# Patient Record
Sex: FEMALE | Race: WHITE | NOT HISPANIC OR LATINO | Employment: FULL TIME | URBAN - METROPOLITAN AREA
[De-identification: names, ages, dates, MRNs, and addresses within clinical notes are randomized per-mention and may not be internally consistent; named-entity substitution may affect disease eponyms.]

---

## 2019-10-29 ENCOUNTER — APPOINTMENT (EMERGENCY)
Dept: CT IMAGING | Facility: HOSPITAL | Age: 39
End: 2019-10-29
Payer: COMMERCIAL

## 2019-10-29 ENCOUNTER — HOSPITAL ENCOUNTER (EMERGENCY)
Facility: HOSPITAL | Age: 39
Discharge: HOME | End: 2019-10-29
Attending: EMERGENCY MEDICINE | Admitting: EMERGENCY MEDICINE
Payer: COMMERCIAL

## 2019-10-29 VITALS
HEIGHT: 65 IN | SYSTOLIC BLOOD PRESSURE: 137 MMHG | RESPIRATION RATE: 18 BRPM | OXYGEN SATURATION: 97 % | DIASTOLIC BLOOD PRESSURE: 74 MMHG | HEART RATE: 79 BPM | WEIGHT: 280 LBS | BODY MASS INDEX: 46.65 KG/M2 | TEMPERATURE: 98.4 F

## 2019-10-29 DIAGNOSIS — N20.0 KIDNEY STONE ON LEFT SIDE: Primary | ICD-10-CM

## 2019-10-29 LAB
ALBUMIN SERPL BCP-MCNC: 3.5 G/DL (ref 3.5–5)
ALP SERPL-CCNC: 112 U/L (ref 46–116)
ALT SERPL W P-5'-P-CCNC: 31 U/L (ref 12–78)
ANION GAP SERPL CALCULATED.3IONS-SCNC: 3 MMOL/L (ref 4–13)
AST SERPL W P-5'-P-CCNC: 13 U/L (ref 5–45)
BACTERIA UR QL AUTO: ABNORMAL /HPF
BASOPHILS # BLD AUTO: 0.07 THOUSANDS/ΜL (ref 0–0.1)
BASOPHILS NFR BLD AUTO: 1 % (ref 0–1)
BILIRUB SERPL-MCNC: 0.3 MG/DL (ref 0.2–1)
BILIRUB UR QL STRIP: NEGATIVE
BUN SERPL-MCNC: 11 MG/DL (ref 5–25)
CALCIUM SERPL-MCNC: 9.5 MG/DL (ref 8.3–10.1)
CAOX CRY URNS QL MICRO: ABNORMAL /HPF
CHLORIDE SERPL-SCNC: 102 MMOL/L (ref 100–108)
CLARITY UR: CLEAR
CO2 SERPL-SCNC: 31 MMOL/L (ref 21–32)
COLOR UR: YELLOW
CREAT SERPL-MCNC: 0.81 MG/DL (ref 0.6–1.3)
EOSINOPHIL # BLD AUTO: 0.36 THOUSAND/ΜL (ref 0–0.61)
EOSINOPHIL NFR BLD AUTO: 3 % (ref 0–6)
ERYTHROCYTE [DISTWIDTH] IN BLOOD BY AUTOMATED COUNT: 14.7 % (ref 11.6–15.1)
EXT PREG TEST URINE: NEGATIVE
EXT. CONTROL ED NAV: NORMAL
GFR SERPL CREATININE-BSD FRML MDRD: 92 ML/MIN/1.73SQ M
GLUCOSE SERPL-MCNC: 124 MG/DL (ref 65–140)
GLUCOSE UR STRIP-MCNC: NEGATIVE MG/DL
HCT VFR BLD AUTO: 48.6 % (ref 34.8–46.1)
HGB BLD-MCNC: 15.2 G/DL (ref 11.5–15.4)
HGB UR QL STRIP.AUTO: ABNORMAL
IMM GRANULOCYTES # BLD AUTO: 0.05 THOUSAND/UL (ref 0–0.2)
IMM GRANULOCYTES NFR BLD AUTO: 0 % (ref 0–2)
KETONES UR STRIP-MCNC: NEGATIVE MG/DL
LEUKOCYTE ESTERASE UR QL STRIP: NEGATIVE
LYMPHOCYTES # BLD AUTO: 2.67 THOUSANDS/ΜL (ref 0.6–4.47)
LYMPHOCYTES NFR BLD AUTO: 21 % (ref 14–44)
MCH RBC QN AUTO: 26.6 PG (ref 26.8–34.3)
MCHC RBC AUTO-ENTMCNC: 31.3 G/DL (ref 31.4–37.4)
MCV RBC AUTO: 85 FL (ref 82–98)
MONOCYTES # BLD AUTO: 0.59 THOUSAND/ΜL (ref 0.17–1.22)
MONOCYTES NFR BLD AUTO: 5 % (ref 4–12)
MUCOUS THREADS UR QL AUTO: ABNORMAL
NEUTROPHILS # BLD AUTO: 8.95 THOUSANDS/ΜL (ref 1.85–7.62)
NEUTS SEG NFR BLD AUTO: 70 % (ref 43–75)
NITRITE UR QL STRIP: NEGATIVE
NON-SQ EPI CELLS URNS QL MICRO: ABNORMAL /HPF
NRBC BLD AUTO-RTO: 0 /100 WBCS
PH UR STRIP.AUTO: 5.5 [PH]
PLATELET # BLD AUTO: 451 THOUSANDS/UL (ref 149–390)
PMV BLD AUTO: 9.9 FL (ref 8.9–12.7)
POTASSIUM SERPL-SCNC: 3.6 MMOL/L (ref 3.5–5.3)
PROT SERPL-MCNC: 8.1 G/DL (ref 6.4–8.2)
PROT UR STRIP-MCNC: NEGATIVE MG/DL
RBC # BLD AUTO: 5.72 MILLION/UL (ref 3.81–5.12)
RBC #/AREA URNS AUTO: ABNORMAL /HPF
SODIUM SERPL-SCNC: 136 MMOL/L (ref 136–145)
SP GR UR STRIP.AUTO: 1.02 (ref 1–1.03)
UROBILINOGEN UR QL STRIP.AUTO: 0.2 E.U./DL
WBC # BLD AUTO: 12.69 THOUSAND/UL (ref 4.31–10.16)
WBC #/AREA URNS AUTO: ABNORMAL /HPF

## 2019-10-29 PROCEDURE — 74176 CT ABD & PELVIS W/O CONTRAST: CPT

## 2019-10-29 PROCEDURE — 81025 URINE PREGNANCY TEST: CPT | Performed by: PHYSICIAN ASSISTANT

## 2019-10-29 PROCEDURE — 96374 THER/PROPH/DIAG INJ IV PUSH: CPT

## 2019-10-29 PROCEDURE — 99284 EMERGENCY DEPT VISIT MOD MDM: CPT

## 2019-10-29 PROCEDURE — 99284 EMERGENCY DEPT VISIT MOD MDM: CPT | Performed by: PHYSICIAN ASSISTANT

## 2019-10-29 PROCEDURE — 81001 URINALYSIS AUTO W/SCOPE: CPT | Performed by: PHYSICIAN ASSISTANT

## 2019-10-29 PROCEDURE — 85025 COMPLETE CBC W/AUTO DIFF WBC: CPT | Performed by: PHYSICIAN ASSISTANT

## 2019-10-29 PROCEDURE — 80053 COMPREHEN METABOLIC PANEL: CPT | Performed by: PHYSICIAN ASSISTANT

## 2019-10-29 PROCEDURE — 36415 COLL VENOUS BLD VENIPUNCTURE: CPT | Performed by: PHYSICIAN ASSISTANT

## 2019-10-29 RX ORDER — CEFDINIR 300 MG/1
CAPSULE ORAL
COMMUNITY
Start: 2019-10-15 | End: 2022-04-07

## 2019-10-29 RX ORDER — TAMSULOSIN HYDROCHLORIDE 0.4 MG/1
0.4 CAPSULE ORAL
Qty: 5 CAPSULE | Refills: 0 | Status: SHIPPED | OUTPATIENT
Start: 2019-10-29 | End: 2019-11-03

## 2019-10-29 RX ORDER — ONDANSETRON 4 MG/1
TABLET, ORALLY DISINTEGRATING ORAL
COMMUNITY
Start: 2019-10-04 | End: 2022-04-07

## 2019-10-29 RX ORDER — TAMSULOSIN HYDROCHLORIDE 0.4 MG/1
CAPSULE ORAL
COMMUNITY
Start: 2019-10-04 | End: 2022-04-07

## 2019-10-29 RX ORDER — HYDROCHLOROTHIAZIDE 25 MG/1
25 TABLET ORAL DAILY
COMMUNITY
Start: 2015-09-26 | End: 2022-04-07

## 2019-10-29 RX ORDER — TRAZODONE HYDROCHLORIDE 100 MG/1
TABLET ORAL
COMMUNITY
Start: 2019-09-16 | End: 2022-04-07

## 2019-10-29 RX ORDER — TRAMADOL HYDROCHLORIDE 50 MG/1
TABLET ORAL
COMMUNITY
Start: 2019-10-04 | End: 2022-04-07

## 2019-10-29 RX ORDER — OMEPRAZOLE 20 MG/1
TABLET, DELAYED RELEASE ORAL
COMMUNITY
Start: 2016-03-12

## 2019-10-29 RX ORDER — DICLOFENAC SODIUM 75 MG/1
TABLET, DELAYED RELEASE ORAL
COMMUNITY
Start: 2019-09-16 | End: 2022-04-07

## 2019-10-29 RX ORDER — BUPROPION HYDROCHLORIDE 300 MG/1
TABLET ORAL
COMMUNITY
Start: 2016-06-10 | End: 2022-04-07

## 2019-10-29 RX ORDER — KETOROLAC TROMETHAMINE 30 MG/ML
15 INJECTION, SOLUTION INTRAMUSCULAR; INTRAVENOUS ONCE
Status: COMPLETED | OUTPATIENT
Start: 2019-10-29 | End: 2019-10-29

## 2019-10-29 RX ORDER — HYDROCODONE BITARTRATE AND ACETAMINOPHEN 5; 325 MG/1; MG/1
1 TABLET ORAL EVERY 4 HOURS PRN
Qty: 15 TABLET | Refills: 0 | Status: SHIPPED | OUTPATIENT
Start: 2019-10-29 | End: 2022-04-07

## 2019-10-29 RX ORDER — TOPIRAMATE 100 MG/1
100 TABLET, FILM COATED ORAL 2 TIMES DAILY
COMMUNITY
Start: 2019-03-29 | End: 2022-04-07

## 2019-10-29 RX ADMIN — KETOROLAC TROMETHAMINE 15 MG: 30 INJECTION, SOLUTION INTRAMUSCULAR at 19:42

## 2019-10-30 ENCOUNTER — TELEPHONE (OUTPATIENT)
Dept: UROLOGY | Facility: HOSPITAL | Age: 39
End: 2019-10-30

## 2019-10-30 NOTE — DISCHARGE INSTRUCTIONS
Rest, increase fluids  Take vicodin as needed for pain  Follow up with urologist this week for recheck  Return to ER if pain worsens, vomiting, fevers

## 2019-10-30 NOTE — TELEPHONE ENCOUNTER
Per Janki Anand PA-C Pt  Needs appt with first available provider before I can schedule her for any surgery

## 2019-10-30 NOTE — TELEPHONE ENCOUNTER
Called Pt she does not have insurance and is making phone calls and is not located close to [facility] from her home  She needs to get her insurance situated and will call back to get scheduled then

## 2019-12-13 NOTE — TELEPHONE ENCOUNTER
Called pt and her insurance is good right now  Scheduled her in to see Dr LUCERO @ [facility] closer to her home  She was off til xmas

## 2020-01-21 ENCOUNTER — TELEPHONE (OUTPATIENT)
Dept: UROLOGY | Facility: MEDICAL CENTER | Age: 40
End: 2020-01-21

## 2020-01-21 NOTE — TELEPHONE ENCOUNTER
Patient to be seen by Dr Laly Maher at [facility]  Patient called to report S/P Er visit at [address] in [facility]  Had hematuria with clots noted  Had Cat Scan - reported kidney stones are gone    Cat Scan did note a cyst on the Right kidney  Patient has appointment  at this time  Please call if necessary  She can be reached at [phone]  Thank you

## 2020-01-21 NOTE — TELEPHONE ENCOUNTER
Patient discharged from [facility] ER with UTI diagnosis  Sent home with Keflex script  Appointment on 1/24/20 appropriate as scheduled

## 2020-01-24 ENCOUNTER — OFFICE VISIT (OUTPATIENT)
Dept: UROLOGY | Facility: CLINIC | Age: 40
End: 2020-01-24
Payer: COMMERCIAL

## 2020-01-24 VITALS
BODY MASS INDEX: 48.82 KG/M2 | HEART RATE: 72 BPM | DIASTOLIC BLOOD PRESSURE: 80 MMHG | HEIGHT: 65 IN | SYSTOLIC BLOOD PRESSURE: 132 MMHG | WEIGHT: 293 LBS

## 2020-01-24 DIAGNOSIS — N20.0 NEPHROLITHIASIS: ICD-10-CM

## 2020-01-24 DIAGNOSIS — N28.1 RENAL CYST, ACQUIRED, RIGHT: ICD-10-CM

## 2020-01-24 DIAGNOSIS — N30.01 ACUTE CYSTITIS WITH HEMATURIA: Primary | ICD-10-CM

## 2020-01-24 PROCEDURE — 99204 OFFICE O/P NEW MOD 45 MIN: CPT | Performed by: UROLOGY

## 2020-01-24 RX ORDER — CEFUROXIME AXETIL 500 MG/1
500 TABLET ORAL EVERY 12 HOURS SCHEDULED
Qty: 10 TABLET | Refills: 0 | Status: SHIPPED | OUTPATIENT
Start: 2020-01-24 | End: 2020-01-25 | Stop reason: SDUPTHER

## 2020-01-24 NOTE — PROGRESS NOTES
UROLOGY NEW CONSULT NOTE     CHIEF COMPLAINT   Ajit Varela is a 46 y o  male with a complaint of   Chief Complaint   Patient presents with    Nephrolithiasis       History of Present Illness:     46 y o  female who presented to the emergency room in October of 2019  Patient was found to have a 5 mm ureterovesical junction stone  Patient's discomfort had been ongoing for approximately 8 weeks prior to presenting  She did not have insurance at the time and so her follow-up was delayed  She presents today for discussion  Patient was recently seen in an outside emergency room due to burning and hematuria  She was diagnosed with urinary tract infection started on a cephalosporin antibiotic  She has lost the prescription  She does feel better  CT scan was obtained that time that demonstrated passage of the stone    It re- demonstrated a right-sided renal cyst     Past Medical History:     Past Medical History:   Diagnosis Date    Hypertension        PAST SURGICAL HISTORY:     Past Surgical History:   Procedure Laterality Date    KNEE SURGERY      TONSILLECTOMY         CURRENT MEDICATIONS:     Current Outpatient Medications   Medication Sig Dispense Refill    buPROPion (WELLBUTRIN XL) 300 mg 24 hr tablet       cefdinir (OMNICEF) 300 mg capsule       diclofenac (VOLTAREN) 75 mg EC tablet TAKE ONE TABLET BY MOUTH TWICE DAILY      hydrochlorothiazide (HYDRODIURIL) 25 mg tablet Take 25 mg by mouth daily      HYDROcodone-acetaminophen (NORCO) 5-325 mg per tablet Take 1 tablet by mouth every 4 (four) hours as needed for painMax Daily Amount: 6 tablets 15 tablet 0    omeprazole (PRILOSEC OTC) 20 MG tablet   Start: 10/03/19 23:42:00 EDT      ondansetron (ZOFRAN-ODT) 4 mg disintegrating tablet       OXYCODONE HCL PO       tamsulosin (FLOMAX) 0 4 mg       topiramate (TOPAMAX) 100 mg tablet Take 100 mg by mouth 2 (two) times a day      traMADol (ULTRAM) 50 mg tablet       traZODone (DESYREL) 100 mg tablet TAKE ONE TABLET BY MOUTH ONE TIME DAILY IN THE EVENING      cefuroxime (CEFTIN) 500 mg tablet Take 1 tablet (500 mg total) by mouth every 12 (twelve) hours for 5 days 10 tablet 0    tamsulosin (FLOMAX) 0 4 mg Take 1 capsule (0 4 mg total) by mouth daily with dinner for 5 days 5 capsule 0     No current facility-administered medications for this visit  ALLERGIES:   No Known Allergies    SOCIAL HISTORY:     Social History     Socioeconomic History    Marital status: Single     Spouse name: None    Number of children: None    Years of education: None    Highest education level: None   Occupational History    None   Social Needs    Financial resource strain: None    Food insecurity:     Worry: None     Inability: None    Transportation needs:     Medical: None     Non-medical: None   Tobacco Use    Smoking status: Current Every Day Smoker     Packs/day: 0 25    Smokeless tobacco: Never Used   Substance and Sexual Activity    Alcohol use: Not Currently     Comment: socially     Drug use: Never    Sexual activity: None   Lifestyle    Physical activity:     Days per week: None     Minutes per session: None    Stress: None   Relationships    Social connections:     Talks on phone: None     Gets together: None     Attends Scientologist service: None     Active member of club or organization: None     Attends meetings of clubs or organizations: None     Relationship status: None    Intimate partner violence:     Fear of current or ex partner: None     Emotionally abused: None     Physically abused: None     Forced sexual activity: None   Other Topics Concern    None   Social History Narrative    None       SOCIAL HISTORY:   History reviewed  No pertinent family history  REVIEW OF SYSTEMS:     Review of Systems   Constitutional: Negative  Respiratory: Negative  Cardiovascular: Negative  Gastrointestinal: Negative  Genitourinary: Positive for dysuria and hematuria  Musculoskeletal: Negative  Skin: Negative  Psychiatric/Behavioral: Negative  PHYSICAL EXAM:     /80   Pulse 72   Ht 5' 5" (1 651 m)   Wt 133 kg (294 lb)   BMI 48 92 kg/m²     General:    Morbidly obese female in no acute distress  They have a normal affect  There is not appear to be any gross neurologic defects or abnormalities  HEENT:  Normocephalic, atraumatic  Neck is supple without any palpable lymphadenopathy  Cardiovascular:  Patient has normal palpable distal radial pulses  There is no significant peripheral edema  No JVD is noted  Respiratory:  Patient has unlabored respirations  There is no audible wheeze or rhonchi  Abdomen:  Abdomen is soft and nontender  There is no tympany  Inguinal and umbilical hernia are not appreciated  Musculoskeletal:  Patient does not have significant CVA tenderness in the  flank with palpation or percussion  They full range of motion in all 4 extremities  Strength in all 4 extremities appears congruent  Patient is able to ambulate without assistance or difficulty  Dermatologic:  Patient has no skin abnormalities or rashes  LABS:     CBC:   Lab Results   Component Value Date    WBC 12 69 (H) 10/29/2019    HGB 15 2 10/29/2019    HCT 48 6 (H) 10/29/2019    MCV 85 10/29/2019     (H) 10/29/2019       BMP:   Lab Results   Component Value Date    CALCIUM 9 5 10/29/2019    K 3 6 10/29/2019    CO2 31 10/29/2019     10/29/2019    BUN 11 10/29/2019    CREATININE 0 81 10/29/2019       IMAGING:     10/29/19  CT ABDOMEN AND PELVIS WITHOUT IV CONTRAST - LOW DOSE RENAL STONE      INDICATION:   left flank pain      COMPARISON:  None      TECHNIQUE:  Low dose thin section CT examination of the abdomen and pelvis was performed without intravenous or oral contrast according to a protocol specifically designed to evaluate for urinary tract calculus    Axial, sagittal, and coronal 2D   reformatted images were created from the source data and submitted for interpretation  Evaluation for pathology in the abdomen and pelvis that is unrelated to urinary tract calculi is limited       Radiation dose length product (DLP) for this visit:  951 mGy-cm   This examination, like all CT scans performed in the [facility], was performed utilizing techniques to minimize radiation dose exposure, including the use of iterative   reconstruction and automated exposure control       FINDINGS:     RIGHT KIDNEY AND URETER:  No urinary tract calculi  No hydronephrosis or hydroureter  4 5 cm interpole cortical cyst     LEFT KIDNEY AND URETER:  5 mm left UV junction calculus resulting in mild left hydroureteronephrosis      URINARY BLADDER:   Unremarkable      No significant abnormality in the visualized lung bases      Limited low radiation dose noncontrast CT evaluation demonstrates no clinically significant abnormality of liver, spleen, pancreas, or adrenal glands  No calcified gallstones or gallbladder wall thickening noted  No ascites or bulky lymphadenopathy on this limited noncontrast study  Bowel loops appear unremarkable  The appendix is well seen and there is no evidence of acute appendicitis  IUD noted in the endometrium  No acute fracture or destructive osseous lesion is identified         IMPRESSION:     5 mm left UV junction calculus resulting in mild left hydroureteronephrosis  ASSESSMENT:     46 y o  female with 5mm distal LEFT UVJ stone    PLAN:      fortunately, the patient's stone has appeared to pass based on her recent outside CT scan which is available for review in Care everywhere  Patient has a renal cyst which we discussed his likely simple and benign based on my review of the films  Patient will obtain x-ray and ultrasound in 6 months which will both trend any additional stone formation but also tracked the renal cyst   We discussed dietary changes that can improve stone formation    Patient will be undergoing gastric bypass surgery in the near future and we will likely consider adding calcium citrate at her next visit  Patient knows to call me with any recurrent urinary symptoms or symptoms of stone disease in the interim

## 2020-01-25 DIAGNOSIS — N30.01 ACUTE CYSTITIS WITH HEMATURIA: ICD-10-CM

## 2020-01-25 RX ORDER — CEFUROXIME AXETIL 500 MG/1
TABLET ORAL
Qty: 10 TABLET | Refills: 0 | Status: SHIPPED | OUTPATIENT
Start: 2020-01-25 | End: 2020-01-30

## 2020-06-26 ENCOUNTER — TELEPHONE (OUTPATIENT)
Dept: UROLOGY | Facility: CLINIC | Age: 40
End: 2020-06-26

## 2022-04-07 ENCOUNTER — OFFICE VISIT (OUTPATIENT)
Dept: URGENT CARE | Facility: CLINIC | Age: 42
End: 2022-04-07
Payer: MEDICARE

## 2022-04-07 VITALS
DIASTOLIC BLOOD PRESSURE: 90 MMHG | OXYGEN SATURATION: 99 % | RESPIRATION RATE: 18 BRPM | BODY MASS INDEX: 31.01 KG/M2 | TEMPERATURE: 97.9 F | HEART RATE: 75 BPM | SYSTOLIC BLOOD PRESSURE: 132 MMHG | HEIGHT: 63 IN | WEIGHT: 175 LBS

## 2022-04-07 DIAGNOSIS — K08.89 PAIN, DENTAL: Primary | ICD-10-CM

## 2022-04-07 PROCEDURE — 96372 THER/PROPH/DIAG INJ SC/IM: CPT | Performed by: PHYSICIAN ASSISTANT

## 2022-04-07 PROCEDURE — 99213 OFFICE O/P EST LOW 20 MIN: CPT | Performed by: PHYSICIAN ASSISTANT

## 2022-04-07 RX ORDER — KETOROLAC TROMETHAMINE 30 MG/ML
60 INJECTION, SOLUTION INTRAMUSCULAR; INTRAVENOUS ONCE
Status: COMPLETED | OUTPATIENT
Start: 2022-04-07 | End: 2022-04-07

## 2022-04-07 RX ORDER — SERTRALINE HYDROCHLORIDE 100 MG/1
TABLET, FILM COATED ORAL
COMMUNITY
Start: 2022-01-26

## 2022-04-07 RX ORDER — QUETIAPINE FUMARATE 100 MG/1
150 TABLET, FILM COATED ORAL
COMMUNITY
Start: 2022-02-04 | End: 2023-02-04

## 2022-04-07 RX ORDER — AMOXICILLIN 500 MG/1
500 CAPSULE ORAL EVERY 12 HOURS SCHEDULED
Qty: 20 CAPSULE | Refills: 0 | Status: SHIPPED | OUTPATIENT
Start: 2022-04-07 | End: 2022-04-17

## 2022-04-07 RX ADMIN — KETOROLAC TROMETHAMINE 60 MG: 30 INJECTION, SOLUTION INTRAMUSCULAR; INTRAVENOUS at 13:13

## 2022-04-07 NOTE — PATIENT INSTRUCTIONS
Dental Abscess   WHAT YOU NEED TO KNOW:   A dental abscess is a collection of pus in or around a tooth  A dental abscess is caused by bacteria  The bacteria can enter the tooth when the enamel (outer part of the tooth) is damaged by tooth decay  Bacteria can also enter the tooth through a chip in the tooth or a cut in the gum  Food particles that are stuck between the teeth for a long time may also lead to an abscess  DISCHARGE INSTRUCTIONS:   Return to the emergency department if:   · You have severe pain in your tooth or jaw  · You have trouble breathing because of pain or swelling  Call your doctor if:   · Your symptoms get worse, even after treatment  · Your mouth is bleeding  · You cannot eat or drink because of pain or swelling  · Your abscess returns  · You have an injury that causes a crack in your tooth  · You have questions or concerns about your condition or care  Medicines: You may  need any of the following:  · Antibiotics  help treat a bacterial infection  · NSAIDs , such as ibuprofen, help decrease swelling, pain, and fever  This medicine is available with or without a doctor's order  NSAIDs can cause stomach bleeding or kidney problems in certain people  If you take blood thinner medicine, always ask your healthcare provider if NSAIDs are safe for you  Always read the medicine label and follow directions  · Acetaminophen  decreases pain and fever  It is available without a doctor's order  Ask how much to take and how often to take it  Follow directions  Read the labels of all other medicines you are using to see if they also contain acetaminophen, or ask your doctor or pharmacist  Acetaminophen can cause liver damage if not taken correctly  Do not use more than 4 grams (4,000 milligrams) total of acetaminophen in one day  · Prescription pain medicine  may be given  Ask your healthcare provider how to take this medicine safely   Some prescription pain medicines contain acetaminophen  Do not take other medicines that contain acetaminophen without talking to your healthcare provider  Too much acetaminophen may cause liver damage  Prescription pain medicine may cause constipation  Ask your healthcare provider how to prevent or treat constipation  · Take your medicine as directed  Contact your healthcare provider if you think your medicine is not helping or if you have side effects  Tell him of her if you are allergic to any medicine  Keep a list of the medicines, vitamins, and herbs you take  Include the amounts, and when and why you take them  Bring the list or the pill bottles to follow-up visits  Carry your medicine list with you in case of an emergency  Self-care:   · Rinse your mouth every 2 hours with salt water  This will help keep the area clean  · Gently brush your teeth twice a day with a soft tooth brush  This will help keep the area clean  · Eat soft foods as directed  Soft foods may cause less pain  Examples include applesauce, yogurt, and cooked pasta  Ask your healthcare provider how long to follow this instruction  · Apply a warm compress to your tooth or gum  Use a cotton ball or gauze soaked in warm water  Remove the compress in 10 minutes or when it becomes cool  Repeat 3 times a day  Prevent another abscess:   · Brush your teeth at least 2 times a day  with fluoride toothpaste  · Use dental floss at least once a day  to clean between your teeth  · Rinse your mouth with water or mouthwash  after meals and snacks  Chew sugarless gum  · Avoid sugary and starchy food that can stick between your teeth  Limit drinks high in sugar, such as soda or fruit juice  · See your dentist every 6 months  for dental cleanings and oral exams  Follow up with your doctor or dentist in 24 hours, or as directed: Your healthcare provider will need to check your teeth and gums   Write down your questions so you remember to ask them during your visits  © Copyright [employer] 2022 Information is for End User's use only and may not be sold, redistributed or otherwise used for commercial purposes  All illustrations and images included in CareNotes® are the copyrighted property of A D A M [employer] Inc  or Yifan Ewing  The above information is an  only  It is not intended as medical advice for individual conditions or treatments  Talk to your doctor, nurse or pharmacist before following any medical regimen to see if it is safe and effective for you

## 2022-04-07 NOTE — PROGRESS NOTES
[facility]        NAME: Kristie Ramey is a 40 y o  female  : 1980    MRN: [id number]  DATE: 2022  TIME: 2:02 PM    Assessment and Plan   Pain, dental [K08 89]  1  Pain, dental  amoxicillin (AMOXIL) 500 mg capsule    ketorolac (TORADOL) injection 60 mg     Infection verse cavity/nerve pain  Will trial antibiotics and have her follow-up with Dentistry  Patient Instructions   Take antibiotic as prescribed  Complete full dose even symptoms began to improve  Tylenol pure Orajel  Over-the-counter temporary filling  Make appoint with dentist     Follow up with PCP in 3-5 days  Proceed to  ER if symptoms worsen  Chief Complaint     Chief Complaint   Patient presents with    Dental Pain     Right side of face dental pain Started 3 days agol          History of Present Illness       Patient is a 28-year-old dental pain is located to right upper teeth described as 5/10 which is worse with eating and drinking  States he has had problems with this tooth before and was told she may need a root canal in the future  Denies fevers, chills, tongue and throat swelling, or facial swelling  She took Tylenol with no relief  States she has called multiple oral surgeons to our and able to see her for 1 or 2 months  Review of Systems   Review of Systems   Constitutional: Negative for chills and fever  HENT: Positive for dental problem  Negative for congestion, facial swelling, postnasal drip, rhinorrhea, sore throat and trouble swallowing  Respiratory: Negative for cough            Current Medications       Current Outpatient Medications:     QUEtiapine (SEROquel) 100 mg tablet, Take 100 mg by mouth, Disp: , Rfl:     amoxicillin (AMOXIL) 500 mg capsule, Take 1 capsule (500 mg total) by mouth every 12 (twelve) hours for 10 days, Disp: 20 capsule, Rfl: 0    omeprazole (PRILOSEC OTC) 20 MG tablet,  Start: 10/03/19 23:42:00 EDT, Disp: , Rfl:     sertraline (ZOLOFT) 100 mg tablet, , Disp: , Rfl:   No current facility-administered medications for this visit  Current Allergies     Allergies as of 04/07/2022    (No Known Allergies)            The following portions of the patient's history were reviewed and updated as appropriate: allergies, current medications, past family history, past medical history, past social history, past surgical history and problem list      Past Medical History:   Diagnosis Date    Hypertension        Past Surgical History:   Procedure Laterality Date    GASTRIC BYPASS      KNEE SURGERY      TONSILLECTOMY         History reviewed  No pertinent family history  Medications have been verified  Objective   /90   Pulse 75   Temp 97 9 °F (36 6 °C)   Resp 18   Ht 5' 3" (1 6 m)   Wt 79 4 kg (175 lb)   SpO2 99%   BMI 31 00 kg/m²   No LMP recorded  Patient has had an implant  Physical Exam     Physical Exam  Vitals and nursing note reviewed  Constitutional:       Appearance: Normal appearance  She is well-developed  HENT:      Head: Normocephalic and atraumatic  Right Ear: Tympanic membrane, ear canal and external ear normal       Left Ear: Tympanic membrane, ear canal and external ear normal       Nose: Nose normal       Mouth/Throat:      Mouth: Mucous membranes are moist       Dentition: Dental tenderness present  No gingival swelling, dental caries or dental abscesses  Pharynx: Oropharynx is clear  Uvula midline  Eyes:      General: Lids are normal       Conjunctiva/sclera: Conjunctivae normal       Pupils: Pupils are equal, round, and reactive to light  Cardiovascular:      Rate and Rhythm: Normal rate and regular rhythm  Pulses: Normal pulses  Heart sounds: Normal heart sounds  No murmur heard  No friction rub  No gallop  Pulmonary:      Effort: Pulmonary effort is normal       Breath sounds: Normal breath sounds  No wheezing, rhonchi or rales     Musculoskeletal:         General: Normal range of motion  Cervical back: Neck supple  Lymphadenopathy:      Cervical: No cervical adenopathy  Skin:     General: Skin is warm and dry  Capillary Refill: Capillary refill takes less than 2 seconds  Neurological:      Mental Status: She is alert

## 2022-04-27 ENCOUNTER — OFFICE VISIT (OUTPATIENT)
Dept: URGENT CARE | Facility: CLINIC | Age: 42
End: 2022-04-27
Payer: MEDICARE

## 2022-04-27 VITALS
BODY MASS INDEX: 31.36 KG/M2 | WEIGHT: 177 LBS | TEMPERATURE: 97.2 F | SYSTOLIC BLOOD PRESSURE: 137 MMHG | OXYGEN SATURATION: 98 % | HEIGHT: 63 IN | DIASTOLIC BLOOD PRESSURE: 81 MMHG | RESPIRATION RATE: 16 BRPM | HEART RATE: 81 BPM
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
## 2022-04-27 PROCEDURE — 99213 OFFICE O/P EST LOW 20 MIN: CPT | Performed by: PHYSICIAN ASSISTANT

## 2022-04-27 RX ORDER — DOXYCYCLINE 100 MG/1
200 TABLET ORAL ONCE
Qty: 2 TABLET | Refills: 0 | Status: SHIPPED | OUTPATIENT
Start: 2022-04-27 | End: 2022-04-27

## 2022-04-27 RX ORDER — LOSARTAN POTASSIUM 50 MG/1
50 TABLET ORAL DAILY
COMMUNITY
Start: 2022-04-19

## 2022-04-27 NOTE — PATIENT INSTRUCTIONS
Tick Bite   WHAT YOU NEED TO KNOW:   Ticks need to be removed quickly  Most tick bites are not dangerous, but ticks can pass disease or infection when they bite  Diseases include Lyme disease, babesiosis, tularemia, and [place] Spotted Fever  Signs and symptoms may develop weeks or even months after a bite  Some may happen right away, such as redness, pain, itching, and swelling near the bite area  Watch for a fever, rash, body aches, or breathing problems  These may be symptoms of a serious disease that needs to be treated quickly  DISCHARGE INSTRUCTIONS:   Return to the emergency department if:   · You have trouble walking or moving your legs  · You have joint pain, muscle pain, or muscle weakness within 1 month of a tick bite  · You have a fever, chills, headache, or rash  Call your doctor if:   · You cannot remove the tick  · The tick's head is stuck in your skin  · You have questions or concerns about your condition or care  Medicines:   · Medicines  help decrease pain, redness, itching, and swelling  You may also need medicine to prevent or fight a bacterial infection  These medicines may be given as a cream, lotion, or pill  · Take your medicine as directed  Contact your healthcare provider if you think your medicine is not helping or if you have side effects  Tell him of her if you are allergic to any medicine  Keep a list of the medicines, vitamins, and herbs you take  Include the amounts, and when and why you take them  Bring the list or the pill bottles to follow-up visits  Carry your medicine list with you in case of an emergency  How to remove a tick:  Remove the tick as soon as possible to help prevent disease or infection  You are less likely to get sick from a tick bite if you remove the tick within 24 hours  Do not use petroleum jelly, nail polish, rubbing alcohol, or heat  These do not work and may be dangerous   Do the following to remove a tick:  · First, try a soapy cotton ball  Soak a cotton ball in liquid soap  Cover the tick with the cotton ball for 30 seconds  The tick may come off with the cotton ball when you pull it away  · Use tweezers if the soapy cotton ball does not work  Grasp the tick as close to your skin as possible  Pull the tick straight up and out  Do not touch the tick with your bare hands  · Do not twist or jerk the tick suddenly, because this may break off the tick's head or mouth parts  Do not leave any part of the tick in your skin  · Do not crush or squeeze the tick since its body may be infected with germs  Flush the tick down the toilet  · After the tick is removed, clean the area of the bite with rubbing alcohol  Then wash your hands with soap and water  Apply ice  on your bite for 15 to 20 minutes every hour or as directed  Use an ice pack, or put crushed ice in a plastic bag  Cover it with a towel before you apply it to your skin  Ice helps prevent tissue damage and decreases swelling and pain  Prevent a tick bite:  Ticks live in areas covered by brush and grass  They may even be found in your lawn if you live in certain areas  Outdoor pets can carry ticks inside the house  Ticks can grab onto you or your clothes when you walk by grass or brush  If you go into areas that contain many trees, tall grasses, and underbrush, do the following:  · Wear light colored pants and a long-sleeved shirt  Tuck your pants into your socks or boots  Tuck in your shirt  Wear sleeves that fit close to the skin at your wrists and neck  This will help prevent ticks from crawling through gaps in your clothing and onto your skin  Wear a hat in areas with trees  · Apply insect repellant on your skin  The insect repellant should contain DEET  Do not put insect repellant on skin that is cut, scratched, or irritated  Always use soap and water to wash the insect repellant off as soon as possible once you are indoors   Do not apply insect repellant on your child's face or hands  · Spray insect repellant onto your clothes  Use permethrin spray  This spray kills ticks that crawl on your clothing  Be sure to spray the tops of your boots, bottom of pant legs, and sleeve cuffs  As soon as possible, wash and dry clothing in hot water and high heat  · Check your clothing, hair, and skin for ticks  Shower within 2 hours of coming indoors  Carefully check the hairline, armpits, neck, and waist  Check your pets and children for ticks  Remove ticks from pets the same way as you remove them from people  · Decrease the risk for ticks in your yard  Ticks like to live in shady, moist areas  [place] your lawn regularly to keep the grass short  Trim the grass around birdbaths and fences  Cut branches that are overgrown and take them out of the yard  Clear out leaf piles  [place] firewood in a dry, jaspal area  Follow up with your doctor as directed:  Write down your questions so you remember to ask them during your visits  © Copyright [employer] 2022 Information is for End User's use only and may not be sold, redistributed or otherwise used for commercial purposes  All illustrations and images included in CareNotes® are the copyrighted property of [employer]  or [employer]if[employer] Ewing  The above information is an  only  It is not intended as medical advice for individual conditions or treatments  Talk to your doctor, nurse or pharmacist before following any medical regimen to see if it is safe and effective for you

## 2022-04-27 NOTE — PROGRESS NOTES
[facility]        NAME: Kristie Ramey is a 40 y o  female  : 1980    MRN: [id number]  DATE: 2022  TIME: 1:02 PM    /81   Pulse 81   Temp (!) 97 2 °F (36 2 °C)   Resp 16   Ht 5' 3" (1 6 m)   Wt 80 3 kg (177 lb)   SpO2 98%   BMI 31 35 kg/m²     Assessment and Plan   Tick bite, unspecified site, initial encounter [X10  XXXA]  1  Tick bite, unspecified site, initial encounter  doxycycline (ADOXA) 100 MG tablet         Patient Instructions       Follow up with PCP in 3-5 days  Proceed to  ER if symptoms worsen  Chief Complaint     Chief Complaint   Patient presents with    tic bite     reports removed tic from right low leg yesterday, leg now has round [place] with dark center  area burns         History of Present Illness       Pt with tic bite to right ankle yesterday        Review of Systems   Review of Systems   Constitutional: Negative  HENT: Negative  Eyes: Negative  Respiratory: Negative  Cardiovascular: Negative  Gastrointestinal: Negative  Endocrine: Negative  Genitourinary: Negative  Negative for flank pain  Musculoskeletal: Negative  Skin: Positive for rash  Allergic/Immunologic: Negative  Neurological: Negative  Hematological: Negative  Psychiatric/Behavioral: Negative  All other systems reviewed and are negative          Current Medications       Current Outpatient Medications:     losartan (COZAAR) 50 mg tablet, Take 50 mg by mouth daily, Disp: , Rfl:     QUEtiapine (SEROquel) 100 mg tablet, Take 150 mg by mouth  , Disp: , Rfl:     sertraline (ZOLOFT) 100 mg tablet, , Disp: , Rfl:     doxycycline (ADOXA) 100 MG tablet, Take 2 tablets (200 mg total) by mouth 1 (one) time for 1 dose, Disp: 2 tablet, Rfl: 0    omeprazole (PRILOSEC OTC) 20 MG tablet,  Start: 10/03/19 23:42:00 EDT (Patient not taking: Reported on 2022), Disp: , Rfl:     Current Allergies     Allergies as of 2022 - Reviewed 2022   Allergen Reaction Noted    Nsaids Other (See Comments) 04/27/2022            The following portions of the patient's history were reviewed and updated as appropriate: allergies, current medications, past family history, past medical history, past social history, past surgical history and problem list      Past Medical History:   Diagnosis Date    Anxiety     Depression     Hypertension        Past Surgical History:   Procedure Laterality Date    GASTRIC BYPASS      KNEE SURGERY      TONSILLECTOMY         Family History   Problem Relation Age of Onset    No Known Problems Mother     No Known Problems Father          Medications have been verified  Objective   /81   Pulse 81   Temp (!) 97 2 °F (36 2 °C)   Resp 16   Ht 5' 3" (1 6 m)   Wt 80 3 kg (177 lb)   SpO2 98%   BMI 31 35 kg/m²        Physical Exam     Physical Exam  Vitals and nursing note reviewed  Constitutional:       Appearance: Normal appearance  She is normal weight  HENT:      Head: Normocephalic and atraumatic  Right Ear: Tympanic membrane, ear canal and external ear normal       Left Ear: Tympanic membrane, ear canal and external ear normal       Nose: Nose normal       Mouth/Throat:      Mouth: Mucous membranes are moist       Pharynx: Oropharynx is clear  Eyes:      Extraocular Movements: Extraocular movements intact  Conjunctiva/sclera: Conjunctivae normal       Pupils: Pupils are equal, round, and reactive to light  Cardiovascular:      Rate and Rhythm: Normal rate and regular rhythm  Pulses: Normal pulses  Heart sounds: Normal heart sounds  Pulmonary:      Effort: Pulmonary effort is normal       Breath sounds: Normal breath sounds  Abdominal:      General: Abdomen is flat  Bowel sounds are normal       Palpations: Abdomen is soft  Musculoskeletal:         General: Normal range of motion  Cervical back: Normal range of motion  Left lower leg: No edema  Skin:     General: Skin is warm  Capillary Refill: Capillary refill takes less than 2 seconds  Comments: Right anterior ankle tick bite 4mm erythema no swelling no pain    Neurological:      General: No focal deficit present  Mental Status: She is alert and oriented to person, place, and time     Psychiatric:         Mood and Affect: Mood normal          Behavior: Behavior normal

## 2023-06-23 ENCOUNTER — OFFICE VISIT (OUTPATIENT)
Age: 43
End: 2023-06-23
Payer: MEDICARE

## 2023-06-23 VITALS
SYSTOLIC BLOOD PRESSURE: 160 MMHG | TEMPERATURE: 97.4 F | DIASTOLIC BLOOD PRESSURE: 90 MMHG | RESPIRATION RATE: 18 BRPM | BODY MASS INDEX: 33.32 KG/M2 | OXYGEN SATURATION: 99 % | WEIGHT: 188.05 LBS | HEART RATE: 73 BPM | HEIGHT: 63 IN

## 2023-06-23 DIAGNOSIS — K08.89 PAIN, DENTAL: Primary | ICD-10-CM

## 2023-06-23 PROCEDURE — 99213 OFFICE O/P EST LOW 20 MIN: CPT | Performed by: EMERGENCY MEDICINE

## 2023-06-23 RX ORDER — CLINDAMYCIN HYDROCHLORIDE 150 MG/1
150 CAPSULE ORAL EVERY 6 HOURS SCHEDULED
Qty: 28 CAPSULE | Refills: 0 | Status: SHIPPED | OUTPATIENT
Start: 2023-06-23 | End: 2023-06-30

## 2023-06-23 RX ORDER — GABAPENTIN 100 MG/1
100 CAPSULE ORAL 3 TIMES DAILY
COMMUNITY

## 2023-06-23 NOTE — PATIENT INSTRUCTIONS
Toothache   WHAT YOU NEED TO KNOW:   A toothache is pain that is caused by irritation of the nerves in the center of your tooth  The irritation may be caused by several problems, such as a cavity, an infection, a cracked tooth, or gum disease  DISCHARGE INSTRUCTIONS:   Return to the emergency department if:   You have trouble breathing or swallowing  You have swelling in your face or neck  Contact your dentist if:   You have a fever and chills  You have trouble opening or closing your mouth  You have swelling around your tooth  You have questions or concerns about your condition or care  Medicines: You may  need any of the following:  NSAIDs , such as ibuprofen, help decrease swelling, pain, and fever  This medicine is available with or without a doctor's order  NSAIDs can cause stomach bleeding or kidney problems in certain people  If you take blood thinner medicine, always ask if NSAIDs are safe for you  Always read the medicine label and follow directions  Do not give these medicines to children younger than 6 months without direction from a healthcare provider  Acetaminophen  decreases pain and fever  It is available without a doctor's order  Ask how much to take and how often to take it  Follow directions  Acetaminophen can cause liver damage if not taken correctly  Prescription pain medicine  may be given  Ask your healthcare provider how to take this medicine safely  Some prescription pain medicines contain acetaminophen  Do not take other medicines that contain acetaminophen without talking to your healthcare provider  Too much acetaminophen may cause liver damage  Prescription pain medicine may cause constipation  Ask your healthcare provider how to prevent or treat constipation  Antibiotics  help treat or prevent a bacterial infection  Take your medicine as directed    Contact your healthcare provider if you think your medicine is not helping or if you have side effects  Tell your provider if you are allergic to any medicine  Keep a list of the medicines, vitamins, and herbs you take  Include the amounts, and when and why you take them  Bring the list or the pill bottles to follow-up visits  Carry your medicine list with you in case of an emergency  Self-care:   Rinse your mouth with warm salt water  4 times a day or as directed  Eat soft foods  to help relieve pain caused by chewing  Apply ice on your jaw or cheek  for 15 to 20 minutes every hour or as directed  Use an ice pack, or put crushed ice in a plastic bag  Cover it with a towel before you apply it  Ice helps prevent tissue damage and decreases swelling and pain  Help prevent a toothache:   Brush your teeth at least 2 times a day  Use dental floss to clean between your teeth at least 1 time a day  See your dentist regularly every 6 months for dental cleanings and oral exams  Follow up with your dentist as directed: You may be referred to a dental surgeon  Write down your questions so you remember to ask them during your visits  © Copyright Rollene Matar 2022 Information is for End User's use only and may not be sold, redistributed or otherwise used for commercial purposes  The above information is an  only  It is not intended as medical advice for individual conditions or treatments  Talk to your doctor, nurse or pharmacist before following any medical regimen to see if it is safe and effective for you

## 2023-06-23 NOTE — PROGRESS NOTES
[facility]        NAME: Mar Millan is a 44 y o  female  : 1980    MRN: [id number]  DATE: 2023  TIME: 6:07 PM    Assessment and Plan   Pain, dental [K08 89]  1  Pain, dental  clindamycin (CLEOCIN) 150 mg capsule    CANCELED: XR tibia fibula 2 vw right    CANCELED: XR spine lumbar 2 or 3 views injury        After informed verbal consent dental block 1% lidocaine with epi, no complications  Patient Instructions     Patient Instructions     Toothache   WHAT YOU NEED TO KNOW:   A toothache is pain that is caused by irritation of the nerves in the center of your tooth  The irritation may be caused by several problems, such as a cavity, an infection, a cracked tooth, or gum disease  DISCHARGE INSTRUCTIONS:   Return to the emergency department if:   • You have trouble breathing or swallowing  • You have swelling in your face or neck  Contact your dentist if:   • You have a fever and chills  • You have trouble opening or closing your mouth  • You have swelling around your tooth  • You have questions or concerns about your condition or care  Medicines: You may  need any of the following:  • NSAIDs , such as ibuprofen, help decrease swelling, pain, and fever  This medicine is available with or without a doctor's order  NSAIDs can cause stomach bleeding or kidney problems in certain people  If you take blood thinner medicine, always ask if NSAIDs are safe for you  Always read the medicine label and follow directions  Do not give these medicines to children younger than 6 months without direction from a healthcare provider  • Acetaminophen  decreases pain and fever  It is available without a doctor's order  Ask how much to take and how often to take it  Follow directions  Acetaminophen can cause liver damage if not taken correctly  • Prescription pain medicine  may be given  Ask your healthcare provider how to take this medicine safely   Some prescription pain medicines contain acetaminophen  Do not take other medicines that contain acetaminophen without talking to your healthcare provider  Too much acetaminophen may cause liver damage  Prescription pain medicine may cause constipation  Ask your healthcare provider how to prevent or treat constipation  • Antibiotics  help treat or prevent a bacterial infection  • Take your medicine as directed  Contact your healthcare provider if you think your medicine is not helping or if you have side effects  Tell your provider if you are allergic to any medicine  Keep a list of the medicines, vitamins, and herbs you take  Include the amounts, and when and why you take them  Bring the list or the pill bottles to follow-up visits  Carry your medicine list with you in case of an emergency  Self-care:   • Rinse your mouth with warm salt water  4 times a day or as directed  • Eat soft foods  to help relieve pain caused by chewing  • Apply ice on your jaw or cheek  for 15 to 20 minutes every hour or as directed  Use an ice pack, or put crushed ice in a plastic bag  Cover it with a towel before you apply it  Ice helps prevent tissue damage and decreases swelling and pain  Help prevent a toothache:   • Brush your teeth at least 2 times a day  • Use dental floss to clean between your teeth at least 1 time a day  • See your dentist regularly every 6 months for dental cleanings and oral exams  Follow up with your dentist as directed: You may be referred to a dental surgeon  Write down your questions so you remember to ask them during your visits  © Copyright Jaime Cagey 2022 Information is for End User's use only and may not be sold, redistributed or otherwise used for commercial purposes  The above information is an  only  It is not intended as medical advice for individual conditions or treatments   Talk to your doctor, nurse or pharmacist before following any medical regimen to see if it is safe and effective for you  Follow up with PCP in 3-5 days  Proceed to  ER if symptoms worsen  Chief Complaint     Chief Complaint   Patient presents with   • Dental Pain     Shooting pain on right side of mouth in tooth x 3 days  History of Present Illness       Patient complains of right upper premolar pain for the past 3 days  Review of Systems   Review of Systems   Constitutional: Negative for chills and fever  HENT: Positive for dental problem  Negative for congestion, rhinorrhea, sinus pressure, sore throat, trouble swallowing and voice change  Respiratory: Negative for cough and shortness of breath  Neurological: Negative for headaches           Current Medications       Current Outpatient Medications:   •  clindamycin (CLEOCIN) 150 mg capsule, Take 1 capsule (150 mg total) by mouth every 6 (six) hours for 7 days, Disp: 28 capsule, Rfl: 0  •  gabapentin (NEURONTIN) 100 mg capsule, Take 100 mg by mouth 3 (three) times a day, Disp: , Rfl:   •  losartan (COZAAR) 50 mg tablet, Take 50 mg by mouth daily, Disp: , Rfl:   •  QUEtiapine (SEROquel) 100 mg tablet, Take 150 mg by mouth  , Disp: , Rfl:   •  sertraline (ZOLOFT) 100 mg tablet, , Disp: , Rfl:   •  omeprazole (PRILOSEC OTC) 20 MG tablet,  Start: 10/03/19 23:42:00 EDT (Patient not taking: No sig reported), Disp: , Rfl:     Current Allergies     Allergies as of 06/23/2023 - Reviewed 06/23/2023   Allergen Reaction Noted   • Nsaids Other (See Comments) 04/21/2022            The following portions of the patient's history were reviewed and updated as appropriate: allergies, current medications, past family history, past medical history, past social history, past surgical history and problem list      Past Medical History:   Diagnosis Date   • Anxiety    • Depression    • Hypertension        Past Surgical History:   Procedure Laterality Date   • GASTRIC BYPASS     • KNEE SURGERY     • TONSILLECTOMY         Family History   Problem "Relation Age of Onset   • No Known Problems Mother    • No Known Problems Father          Medications have been verified  Objective   /90   Pulse 73   Temp (!) 97 4 °F (36 3 °C)   Resp 18   Ht 5' 3\" (1 6 m)   Wt 85 3 kg (188 lb 0 8 oz)   SpO2 99%   BMI 33 31 kg/m²        Physical Exam     Physical Exam  Vitals and nursing note reviewed  Constitutional:       General: She is not in acute distress  Appearance: She is well-developed  HENT:      Head: Normocephalic and atraumatic  Nose: Mucosal edema present  Mouth/Throat:      Pharynx: No oropharyngeal exudate or posterior oropharyngeal erythema  Tonsils: No tonsillar abscesses  Cardiovascular:      Rate and Rhythm: Normal rate and regular rhythm  Musculoskeletal:      Cervical back: Neck supple  Lymphadenopathy:      Cervical: No cervical adenopathy  Skin:     General: Skin is warm and dry  Findings: No rash  Neurological:      Mental Status: She is oriented to person, place, and time                     "

## 2024-02-21 PROBLEM — N30.01 ACUTE CYSTITIS WITH HEMATURIA: Status: RESOLVED | Noted: 2020-01-24 | Resolved: 2024-02-21

## 2024-06-14 ENCOUNTER — APPOINTMENT (OUTPATIENT)
Age: 44
End: 2024-06-14
Payer: COMMERCIAL

## 2024-06-14 ENCOUNTER — OFFICE VISIT (OUTPATIENT)
Age: 44
End: 2024-06-14
Payer: COMMERCIAL

## 2024-06-14 VITALS
RESPIRATION RATE: 18 BRPM | BODY MASS INDEX: 30.56 KG/M2 | TEMPERATURE: 97.7 F | HEART RATE: 66 BPM | DIASTOLIC BLOOD PRESSURE: 78 MMHG | HEIGHT: 64 IN | SYSTOLIC BLOOD PRESSURE: 162 MMHG | WEIGHT: 179.01 LBS | OXYGEN SATURATION: 100 %

## 2024-06-14 DIAGNOSIS — S69.91XA INJURY OF RIGHT WRIST, INITIAL ENCOUNTER: ICD-10-CM

## 2024-06-14 DIAGNOSIS — S69.91XA INJURY OF RIGHT WRIST, INITIAL ENCOUNTER: Primary | ICD-10-CM

## 2024-06-14 PROCEDURE — 96372 THER/PROPH/DIAG INJ SC/IM: CPT | Performed by: EMERGENCY MEDICINE

## 2024-06-14 PROCEDURE — 73110 X-RAY EXAM OF WRIST: CPT

## 2024-06-14 PROCEDURE — G0382 LEV 3 HOSP TYPE B ED VISIT: HCPCS | Performed by: EMERGENCY MEDICINE

## 2024-06-14 RX ORDER — KETOROLAC TROMETHAMINE 30 MG/ML
30 INJECTION, SOLUTION INTRAMUSCULAR; INTRAVENOUS ONCE
Status: COMPLETED | OUTPATIENT
Start: 2024-06-14 | End: 2024-06-14

## 2024-06-14 RX ORDER — METHOCARBAMOL 750 MG/1
750 TABLET, FILM COATED ORAL 3 TIMES DAILY PRN
COMMUNITY
Start: 2024-06-04

## 2024-06-14 RX ADMIN — KETOROLAC TROMETHAMINE 30 MG: 30 INJECTION, SOLUTION INTRAMUSCULAR; INTRAVENOUS at 17:03
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
## 2024-06-14 NOTE — LETTER
June 14, 2024     Patient: Jennifer Doyle   YOB: 1980   Date of Visit: 6/14/2024       To Whom it May Concern:    Jennifer Doyle was seen in my clinic on 6/14/2024. She may return to work on 06/17/2024 night .    If you have any questions or concerns, please don't hesitate to call.         Sincerely,          Refugio Love MD        CC: No Recipients

## 2025-01-27 ENCOUNTER — APPOINTMENT (OUTPATIENT)
Dept: URGENT CARE | Facility: CLINIC | Age: 45
End: 2025-01-27